# Patient Record
Sex: MALE
[De-identification: names, ages, dates, MRNs, and addresses within clinical notes are randomized per-mention and may not be internally consistent; named-entity substitution may affect disease eponyms.]

---

## 2019-10-28 PROBLEM — Z00.00 ENCOUNTER FOR PREVENTIVE HEALTH EXAMINATION: Status: ACTIVE | Noted: 2019-10-28

## 2019-10-29 ENCOUNTER — APPOINTMENT (OUTPATIENT)
Dept: OTOLARYNGOLOGY | Facility: CLINIC | Age: 49
End: 2019-10-29
Payer: COMMERCIAL

## 2019-10-29 VITALS
BODY MASS INDEX: 28.14 KG/M2 | HEIGHT: 69 IN | WEIGHT: 190 LBS | DIASTOLIC BLOOD PRESSURE: 82 MMHG | SYSTOLIC BLOOD PRESSURE: 128 MMHG | HEART RATE: 92 BPM

## 2019-10-29 DIAGNOSIS — Q30.0 CHOANAL ATRESIA: ICD-10-CM

## 2019-10-29 DIAGNOSIS — Z86.19 PERSONAL HISTORY OF OTHER INFECTIOUS AND PARASITIC DISEASES: ICD-10-CM

## 2019-10-29 DIAGNOSIS — Z78.9 OTHER SPECIFIED HEALTH STATUS: ICD-10-CM

## 2019-10-29 DIAGNOSIS — H65.21 CHRONIC SEROUS OTITIS MEDIA, RIGHT EAR: ICD-10-CM

## 2019-10-29 DIAGNOSIS — H90.71 MIXED CONDUCTIVE AND SENSORINEURAL HEARING LOSS, UNILATERAL, RIGHT EAR, WITH UNRESTRICTED HEARING ON THE CONTRALATERAL SIDE: ICD-10-CM

## 2019-10-29 PROCEDURE — 92567 TYMPANOMETRY: CPT

## 2019-10-29 PROCEDURE — 99204 OFFICE O/P NEW MOD 45 MIN: CPT | Mod: 25

## 2019-10-29 PROCEDURE — 92557 COMPREHENSIVE HEARING TEST: CPT

## 2019-10-29 PROCEDURE — 31231 NASAL ENDOSCOPY DX: CPT

## 2019-10-29 RX ORDER — AMOXICILLIN AND CLAVULANATE POTASSIUM 875; 125 MG/1; MG/1
875-125 TABLET, COATED ORAL
Refills: 0 | Status: ACTIVE | COMMUNITY

## 2019-10-29 RX ORDER — METHYLPREDNISOLONE 4 MG/1
4 TABLET ORAL
Qty: 1 | Refills: 0 | Status: ACTIVE | COMMUNITY
Start: 2019-10-29 | End: 1900-01-01

## 2019-10-29 NOTE — ASSESSMENT
[FreeTextEntry1] : No middle ear effusion of uncertain etiology. This is presumed from recent upper respiratory infection. Management options carefully reviewed. I have recommended completing a medical trial. 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 I have recommended followup at which time a middle ear drainage can be considered if effusion persists.\par \par Etiologies of recurrent middle ear effusion discussed.

## 2019-10-29 NOTE — PROCEDURE
[FreeTextEntry6] : PROCEDURE:  NASAL ENDOSCOPY  \par \par Surgeon: Dr. Russell\par Indication: Rhinitis, right otitis media with effusion\par Anesthetic: Topical lidocaine and Afrin\par Procedure: The patient was placed in a sitting position.  Following application of the topical anesthetic and decongestant, exam was performed with a flexible endoscope.  The scope was passed along the right nasal floor to the nasopharynx.  It was then passed into the region of the middle meatus, middle turbinate, and sphenoethmoid region.  An identical procedure was performed on the left side.  The following findings were noted:\par \par Nasal mucosa:   Mild edema\par Mucous: Normal\par \par Septum:[midline, leftward deviation, righward deviation]\par \par \par Right nasal cavity\par      Right middle meatus: no pus, polyps or congestion \par      Right sphenoethmoidal recess: no pus, polyps or congestion  \par      InferiorTurbinates: Intact and non inflammed\par      Middle Turbinates: Intact and non inflammed\par      SuperiorTurbinates: Intact and non inflammed\par \par Left nasal cavity\par      Left middle meatus: no pus, polyps, or congestion\par      Left sphenoethmoidal recess: no pus, polyps or congestion \par      InferiorTurbinates: Intact and non inflammed  \par      Middle Turbinates: Intact and non inflammed\par      SuperiorTurbinates: Intact and non inflammed\par \par Nasopharynx: no masses, choanae patent, no adenoid tissue\par

## 2019-10-29 NOTE — PHYSICAL EXAM
[Midline] : trachea located in midline position [Normal] : no rashes [FreeTextEntry1] : Procedure: Microscopic Ear Exam\par \par Left ear:  \par Ear canal intact without inflammation or lesion.  \par Tympanic membrane intact without inflammation.\par \par \par Right ear:  \par Ear canal intact without inflammation or lesion.  \par Yellow fluid filled the middle ear with small air-fluid level superiorly. No inflammation.

## 2019-10-29 NOTE — DATA REVIEWED
[de-identified] : Complete audiometry was ordered and completed today. This was separately reported by the audiologist. The results were reviewed in detail with the patient.\par \par

## 2019-10-29 NOTE — END OF VISIT
[>50% of Time Spent on Counseling and Coordination of Care for  ___] : Greater than 50% of the encounter time was spent on counseling and coordination of care for [unfilled] <<-----Click here for Discharge Medication Review

## 2019-10-29 NOTE — HISTORY OF PRESENT ILLNESS
[de-identified] : MILTON DAMON has a history of right ear discomfort for about 1- 2 week. Described as fullness and mild pain.  Patient reports of a sore throat prior to having ear discomfort. Treated in in urgent care 3 days ago and placed on antibiotics.  No apparent improvement reported.  Patient denies any exposure to loud noises. Patient reports of decreased hearing in the right ear. Patient reports of tinnitus in the right ear. No otorrhea and no vertigo reported at this visit. 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

## 2019-11-19 ENCOUNTER — APPOINTMENT (OUTPATIENT)
Dept: OTOLARYNGOLOGY | Facility: CLINIC | Age: 49
End: 2019-11-19